# Patient Record
Sex: FEMALE | Race: ASIAN | NOT HISPANIC OR LATINO | URBAN - METROPOLITAN AREA
[De-identification: names, ages, dates, MRNs, and addresses within clinical notes are randomized per-mention and may not be internally consistent; named-entity substitution may affect disease eponyms.]

---

## 2024-03-10 ENCOUNTER — EMERGENCY (EMERGENCY)
Facility: HOSPITAL | Age: 44
LOS: 1 days | Discharge: HOME | End: 2024-03-10
Attending: STUDENT IN AN ORGANIZED HEALTH CARE EDUCATION/TRAINING PROGRAM | Admitting: STUDENT IN AN ORGANIZED HEALTH CARE EDUCATION/TRAINING PROGRAM
Payer: COMMERCIAL

## 2024-03-10 VITALS
SYSTOLIC BLOOD PRESSURE: 102 MMHG | TEMPERATURE: 98 F | DIASTOLIC BLOOD PRESSURE: 52 MMHG | HEART RATE: 85 BPM | RESPIRATION RATE: 18 BRPM | OXYGEN SATURATION: 98 %

## 2024-03-10 VITALS
DIASTOLIC BLOOD PRESSURE: 62 MMHG | HEART RATE: 104 BPM | SYSTOLIC BLOOD PRESSURE: 98 MMHG | OXYGEN SATURATION: 97 % | TEMPERATURE: 98 F | RESPIRATION RATE: 18 BRPM

## 2024-03-10 LAB
FLUAV AG NPH QL: SIGNIFICANT CHANGE UP
FLUBV AG NPH QL: SIGNIFICANT CHANGE UP
RSV RNA NPH QL NAA+NON-PROBE: SIGNIFICANT CHANGE UP
SARS-COV-2 RNA SPEC QL NAA+PROBE: SIGNIFICANT CHANGE UP

## 2024-03-10 PROCEDURE — 99284 EMERGENCY DEPT VISIT MOD MDM: CPT

## 2024-03-10 PROCEDURE — 76815 OB US LIMITED FETUS(S): CPT | Mod: 26

## 2024-03-10 RX ORDER — ACETAMINOPHEN 500 MG
975 TABLET ORAL ONCE
Refills: 0 | Status: COMPLETED | OUTPATIENT
Start: 2024-03-10 | End: 2024-03-10

## 2024-03-10 RX ADMIN — Medication 975 MILLIGRAM(S): at 13:37

## 2024-03-10 NOTE — ED PROVIDER NOTE - CLINICAL SUMMARY MEDICAL DECISION MAKING FREE TEXT BOX
44 y/o F with no significant PMHx currently 21 weeks pregnant, uncomplicated thus far presents to ED for evaluation of nasal congestion x 3 days. States she began with bilateral maxillary sinus pain and congestion, now with headache, rhinorrhea with green discharge and mild non productive cough exacerbated by movement. Patient is clinically well appearing on exam, vitals notable for mildly low blood pressure though patient is thin and is not describing dizziness, near syncope or syncope. Likely has viral sinusitis, will test for COVID/flu, treat symptoms with saline nasal spray and Tylenol. Will consult OB for +/- NST, vs. bedside US in ED to evaluate FHR. Likely d/c with OBGYN f/u.

## 2024-03-10 NOTE — ED ADULT TRIAGE NOTE - CHIEF COMPLAINT QUOTE
Pt 21 weeks pregnant c/o nasal congestion, sinus pressure,  mild sob, and head ache starting Friday. Breathing appears unlabored in triage, 02 97%. Denies pregnancy problems.

## 2024-03-10 NOTE — ED PROVIDER NOTE - NSFOLLOWUPINSTRUCTIONS_ED_ALL_ED_FT
Today you were seen in the ED for evaluation of nasal congestion and sneezing.     You are still pending your viral swab results, these can be viewed by creating an account and logging into the health portal. That can be accessed at the following web address: [email or web address]    We recommend you reach out to your OBGYN and let her know you were seen in the emergency room.     We have provided you with a saline nasal spray. You may use 1-2 sprays in each nostril 2-3 times per day to help with congestion. Using things like humidifiers can also help with dryness in your nose.     You may take Tylenol as needed for discomfort every 6 hours. Please follow the package instructions on the Tylenol you have at home.     SEEK IMMEDIATE MEDICAL CARE IF YOU HAVE ANY OF THE FOLLOWING SYMPTOMS: severe chest pain, difficulty breathing, fainting, fevers that persist despite taking medications over the counter, vomiting blood, dark or bloody stools, inability to tolerate eating and drinking food by mouth, vaginal bleeding, abdominal cramping or contractions

## 2024-03-10 NOTE — ED PROVIDER NOTE - ATTENDING CONTRIBUTION TO CARE
43-year-old female no significant past medical history approximately 21 weeks gestational age presenting for nasal congestion and sinus pain and nasal discharge for approximately 3 days.  Also reports intermittent mild cough.  Denies shortness of breath chest pain, fever, chills denies abdominal pain, nausea, vomiting.  Denies any urinary symptoms any vaginal bleeding or discharge.  Tolerating p.o. well and feeling normal fetal movements.    Exam as above    Patient with upper respiratory illness likely viral.  Lungs clear to auscultation no increased work of breathing.  POCUS demonstrating normal fetal heart rate.  Anticipatory guidance, strict return precautions discussed with patient at bedside and she expressed understanding.  Viral swab sent off and instructed patient how she can access her results online.  Stable for discharge with outpatient follow-up.

## 2024-03-10 NOTE — ED PROVIDER NOTE - PHYSICAL EXAMINATION
Gen: well appearing, in no acute distress   Head: normal appearing  HEENT: normal conjunctiva, oral mucosa moist, vision grossly intact, EOMI, + L maxillary and frontal sinus tenderness, + clear rhinorrhea, appears to have nasal congestion   Lung: no respiratory distress, speaking in full sentences, CTA b/l, no wheeze, crackles or rhonchi   CV: regular rate and rhythm, no murmurs  Abd: soft, gravid uterus, no suprapubic tenderness   MSK: no visible deformities, ambulating without difficulty   Neuro: No focal deficits, AAOx3  Skin: Warm, no rashes   Psych: normal affect

## 2024-03-10 NOTE — ED ADULT NURSE NOTE - OBJECTIVE STATEMENT
Pt. A&OX4, states she's 21wks pregnant, c/o congestion, cough and sinus pain since Friday. Denies fevers. Pain meds given as ordered. Viral swab performed. Vitals stable, breathing well on RA. Respirations even and unlabored. Will continue to monitor.

## 2024-03-10 NOTE — ED PROVIDER NOTE - PATIENT PORTAL LINK FT
You can access the FollowMyHealth Patient Portal offered by [facility] by registering at the following website: [email or web address]. By joining [facility] [facility]’s FollowMyHealth portal, you will also be able to view your health information using other applications (apps) compatible with our system.

## 2024-03-10 NOTE — ED PROVIDER NOTE - PROGRESS NOTE DETAILS
Jorge Luis PGY1:  on bedside ultrasound. discussed with patient and Dr. Danielle symptoms are likely 2/2 viral illness. supportive care discussed. patient understands return precautions and will f/u with OBGYN this coming week. provided saline nasal spray. still pending viral swab results but provided patient with information on how to access portal for results. Jorge Luis PGY1: spoke with OBGYN regarding need for NST vs. bedside US in ED for FHR. per OBGYN, patient is without OB complaints and is less than 24 weeks, ok to do bedside FHR

## 2024-03-10 NOTE — ED PROVIDER NOTE - NS_EDPROVIDERDISPOUSERTYPE_ED_A_ED
[de-identified] : Euflexxa # 2 Left knee\par Discussed at length with the patient the planned Euflexxa injection. The risks, benefits, convalescence and alternatives were reviewed. The possible side effects discussed included but were not limited to: pain, swelling, heat and redness. There symptoms are generally mild but if they are extensive then contact the office. Giving pain relievers by mouth such as NSAID’s or Tylenol can generally treat the reactions to Euflexxa. Rare cases of infection have been noted. Rash, hives and itching may occur post injection. If you have muscle pain or cramps, flushing and or swelling of the face, rapid heart beat, nausea, dizziness, fever, chills, headache, difficulty breathing, swelling in the arms or legs, or have a prickly feeling of your skin, contact a health care provider immediately.\par \par Following this discussion, the knee was prepped with betadine and under sterile condition the Euflexxa injection was performed with a 22 gauge needle. The needle was introduced into the joint, aspiration was performed to ensure intra-articular placement and the medication was injected. Upon withdrawal of the needle the site was cleaned with alcohol and a bandaid applied. The patient tolerated the injection well and there were no adverse effects. Post injection instructions included no strenuous activity for 24 hours, cryotherapy and if there are any adverse effects to contact the office.\par  Attending Attestation (For Attendings USE Only)...

## 2024-03-10 NOTE — ED ADULT NURSE NOTE - NSFALLUNIVINTERV_ED_ALL_ED
Bed/Stretcher in lowest position, wheels locked, appropriate side rails in place/Call bell, personal items and telephone in reach/Instruct patient to call for assistance before getting out of bed/chair/stretcher/Non-slip footwear applied when patient is off stretcher/[place] to call system/Physically safe environment - no spills, clutter or unnecessary equipment/Purposeful proactive rounding/Room/bathroom lighting operational, light cord in reach

## 2024-03-10 NOTE — ED PROVIDER NOTE - OBJECTIVE STATEMENT
42 y/o F with no significant PMHx currently 21 weeks pregnant, uncomplicated thus far presents to ED for evaluation of nasal congestion x 3 days. States she began with bilateral maxillary sinus pain and congestion, now with headache, rhinorrhea with green discharge and mild non productive cough exacerbated by movement. Also endorsing mild shortness of breath. Has not taken any medications OTC due to concerns of harming the fetus. Has been drinking vitamin C. Denies fevers, chest pain, palpitations, abdominal pain, cramping, nausea, vomiting, dysuria, hematuria, gush of fluid or vaginal bleeding. + feeling fetal movement. Had anatomy scan 1 week ago. Last saw OBGYN 2 weeks ago. Taking prenatals. NKDA. 44 y/o F with no significant PMHx currently 21 weeks pregnant, uncomplicated thus far presents to ED for evaluation of nasal congestion x 3 days. States she began with bilateral maxillary sinus pain and congestion, now with headache, rhinorrhea with green discharge and mild non productive cough exacerbated by movement. Also endorsing mild shortness of breath. Has not taken any medications OTC due to concerns of harming the fetus. Has been drinking vitamin C. Denies fevers, chest pain, palpitations, abdominal pain, cramping, nausea, vomiting, dysuria, hematuria, gush of fluid or vaginal bleeding. + feeling fetal movement. Had anatomy scan 1 week ago. Last saw OBGYN 2 weeks ago. Taking prenatals. NKDA.  obgyn: Dr. Moreno with [facility]